# Patient Record
Sex: FEMALE | Race: WHITE | NOT HISPANIC OR LATINO | Employment: FULL TIME | URBAN - METROPOLITAN AREA
[De-identification: names, ages, dates, MRNs, and addresses within clinical notes are randomized per-mention and may not be internally consistent; named-entity substitution may affect disease eponyms.]

---

## 2020-01-25 ENCOUNTER — OFFICE VISIT (OUTPATIENT)
Dept: FAMILY MEDICINE | Facility: CLINIC | Age: 25
End: 2020-01-25
Payer: COMMERCIAL

## 2020-01-25 VITALS
SYSTOLIC BLOOD PRESSURE: 125 MMHG | HEART RATE: 61 BPM | BODY MASS INDEX: 24.44 KG/M2 | HEIGHT: 70 IN | TEMPERATURE: 98.5 F | WEIGHT: 170.75 LBS | RESPIRATION RATE: 16 BRPM | DIASTOLIC BLOOD PRESSURE: 69 MMHG | OXYGEN SATURATION: 100 %

## 2020-01-25 DIAGNOSIS — J01.00 ACUTE MAXILLARY SINUSITIS, RECURRENCE NOT SPECIFIED: Primary | ICD-10-CM

## 2020-01-25 RX ORDER — AMOXICILLIN 875 MG
875 TABLET ORAL 2 TIMES DAILY
Qty: 14 TABLET | Refills: 0 | Status: SHIPPED | OUTPATIENT
Start: 2020-01-25 | End: 2023-01-23

## 2020-01-25 ASSESSMENT — MIFFLIN-ST. JEOR: SCORE: 1604.77

## 2020-01-25 NOTE — PROGRESS NOTES
"SUBJECTIVE:   Yaa Lyon is a 24 year old female who presents to clinic today to discuss the following problem(s).    Patient is new to this clinic but prefers to address only acute issue noted below at this time.     Sinus pain and pressure  - been there for about a month  - was at its worst about 2.5 weeks ago  - but still feels lousy and now PND, headache  - on and off R ear pain  - worst in the morning, feels like she is hung over  - no fever, nausea  - further ROS as noted below    ROS:   CONSTITUTIONAL: NEGATIVE for chills, fatigue, fever, sweats and weight loss  EYES: NEGATIVE for diplopia, eye pain and photophobia  ENT/MOUTH: Intermittent ear pain. Persistent nasal congestion, postnasal drainage and rhinorrhea as noted above  RESP: NEGATIVE for cough, SOB/dyspnea and wheezing  CV: NEGATIVE for chest pain/chest pressure, dyspnea on exertion  GI: NEGATIVE for abdominal pain, diarrhea, dysphagia and nausea  NEURO: NEGATIVE for dizziness/lightheadedness   PSYCHIATRIC: NEGATIVE    Today's PHQ-2:  PHQ-2 ( 1999 Pfizer) 1/25/2020   Q1: Little interest or pleasure in doing things 0   Q2: Feeling down, depressed or hopeless 0   PHQ-2 Score 0       History reviewed. No pertinent past medical history.  History reviewed. No pertinent surgical history.  History reviewed. No pertinent family history.  Social History     Tobacco Use     Smoking status: Never Smoker     Smokeless tobacco: Never Used   Substance Use Topics     Alcohol use: None     Drug use: None     Social History     Social History Narrative     Not on file       No current outpatient medications on file.     No current facility-administered medications for this visit.      I have reviewed the patient's past medical, surgical, family, and social history.     OBJECTIVE:   /69 (BP Location: Left arm, Patient Position: Sitting, Cuff Size: Adult Regular)   Pulse 61   Temp 98.5  F (36.9  C)   Resp 16   Ht 1.778 m (5' 10\")   Wt 77.5 kg (170 lb " 12 oz)   LMP 12/30/2019 (Exact Date)   SpO2 100%   BMI 24.50 kg/m      Constitutional: well-appearing, appears stated age  Eyes: conjunctivae without erythema, sclera anicteric  ENT: audible congestion, tonsillar erythema with posterior drainage, TMs clear and non-bulging bilaterally    Cardiac: regular rate and rhythm, normal S1/S2, no murmur/rubs/gallops  Respiratory: lungs clear to auscultation bilaterally, normal work of breathing, no wheezes/crackles  Skin: no rashes, lesions, or wounds  Psych: affect is full and appropriate, speech is fluent and non-pressured    ASSESSMENT AND PLAN:     Yaa was seen today for sinus problem and headache.    Diagnoses and all orders for this visit:    Acute maxillary sinusitis, recurrence not specified  -     amoxicillin (AMOXIL) 875 MG tablet; Take 1 tablet (875 mg) by mouth 2 times daily    Given prolonged persistence of symptoms I have agreed to start patient on ABX as noted above at this time.     Discussed risks and benefits of medication. Discussed non-pharmacologic symptom management strategies and advised patient of concerns that would indicate a need for further medical advice as noted in patient instructions.       Bradford Cleveland MD  [facility]  01/25/2020, 9:43 AM

## 2020-01-25 NOTE — PATIENT INSTRUCTIONS
Patient Education     Acute Bacterial Rhinosinusitis (ABRS)    Acute bacterial rhinosinusitis (ABRS) is an infection of your nasal cavity and sinuses. It s caused by bacteria. Acute means that you ve had symptoms for less than 4 weeks, but possibly up to 12 weeks.  Understanding your sinuses  The nasal cavity is the large air-filled space behind your nose. The sinuses are a group of spaces formed by the bones of your face. They connect with your nasal cavity. ABRS causes the tissue lining these spaces to become inflamed. Mucus may not drain normally. This leads to facial pain and other symptoms.  What causes ABRS?  ABRS most often follows an upper respiratory infection caused by a virus. Bacteria then infect the lining of your nasal cavity and sinuses. But you can also get ABRS if you have:    Nasal allergies    Long-term nasal swelling and congestion not caused by allergies    Blockage in the nose  Symptoms of ABRS  The symptoms of ABRS may be different for each person and include:    Nasal congestion or blockage    Pain or pressure in the face    Thick, colored drainage from the nose  Other symptoms may include:    Runny nose    Fluid draining from the nose down the throat (postnasal drip)    Headache    Cough    Pain    Fever  Diagnosing ABRS  ABRS may be diagnosed if you ve had an upper respiratory infection like a cold and cough for 10 or more days without improvement or with worsening symptoms. Your healthcare provider will ask about your symptoms and your medical history. The provider will check your vital signs, including your temperature. You ll have a physical exam. The healthcare provider will check your ears, nose, and throat. You likely won t need any tests. If ABRS comes back, you may have a culture or other tests.  Treatment for ABRS  Treatment may include:    Antibiotic medicine. This is for symptoms that last for at least 10 to 14 days.    Nasal corticosteroid medicine. Drops or spray used in the  nose can lessen swelling and congestion.    Over-the-counter pain medicine. This is to lessen sinus pain and pressure.    Nasal decongestant medicine. Spray or drops may help to lessen congestion. Do not use them for more than a few days.    Salt wash (saline irrigation). This can help to loosen mucus.  Possible complications of ABRS  ABRS may come back or become long-term (chronic). In rare cases, ABRS may cause complications such as:     Inflamed tissue around the brain and spinal cord (meningitis)    Inflamed tissue around the eyes (orbital cellulitis)    Inflamed bones around the sinuses (osteitis)  These problems may need to be treated in a hospital with intravenous (IV) antibiotic medicine or surgery.  When to call the healthcare provider  Call your healthcare provider if you have any of the following:    Symptoms that don t get better, or get worse    Symptoms that don t get better after 3 to 5 days on antibiotics    Trouble seeing    Swelling around your eyes    Confusion or trouble staying awake   Date Last Reviewed: 5/1/2017 2000-2019 [employer]. [address] [place] [address]. All rights reserved. This information is not intended as a substitute for professional medical care. Always follow your healthcare professional's instructions.         Patient Education     Amoxicillin capsules or tablets  Brand Names: Amoxil, Moxilin, Sumox, Trimox  What is this medicine?  AMOXICILLIN (a mox i THUY in) is a penicillin antibiotic. It is used to treat certain kinds of bacterial infections. It will not work for colds, flu, or other viral infections.  How should I use this medicine?  Take this medicine by mouth with a glass of water. Follow the directions on your prescription label. You may take this medicine with food or on an empty stomach. Take your medicine at regular intervals. Do not take your medicine more often than directed. Take all of your medicine as directed even if you think  your are better. Do not skip doses or stop your medicine early.  Talk to your pediatrician regarding the use of this medicine in children. While this drug may be prescribed for selected conditions, precautions do apply.  What side effects may I notice from receiving this medicine?  Side effects that you should report to your doctor or health care professional as soon as possible:    allergic reactions like skin rash, itching or hives, swelling of the face, lips, or tongue    breathing problems    dark urine    redness, blistering, peeling or loosening of the skin, including inside the mouth    seizures    severe or watery diarrhea    trouble passing urine or change in the amount of urine    unusual bleeding or bruising    unusually weak or tired    yellowing of the eyes or skin  Side effects that usually do not require medical attention (report to your doctor or health care professional if they continue or are bothersome):    dizziness    headache    stomach upset    trouble sleeping  What may interact with this medicine?    amiloride    birth control pills    chloramphenicol    macrolides    probenecid    sulfonamides    tetracyclines    What if I miss a dose?  If you miss a dose, take it as soon as you can. If it is almost time for your next dose, take only that dose. Do not take double or extra doses.  Where should I keep my medicine?  Keep out of the reach of children.  Store between 68 and 77 degrees F (20 and 25 degrees C). Keep bottle closed tightly. Throw away any unused medicine after the expiration date.  What should I tell my health care provider before I take this medicine?  They need to know if you have any of these conditions:    asthma    kidney disease    an unusual or allergic reaction to amoxicillin, other penicillins, cephalosporin antibiotics, other medicines, foods, dyes, or preservatives    pregnant or trying to get pregnant    breast-feeding  What should I watch for while using this  medicine?  Tell your doctor or health care professional if your symptoms do not improve in 2 or 3 days. Take all of the doses of your medicine as directed. Do not skip doses or stop your medicine early.  If you are diabetic, you may get a false positive result for sugar in your urine with certain brands of urine tests. Check with your doctor.  Do not treat diarrhea with over-the-counter products. Contact your doctor if you have diarrhea that lasts more than 2 days or if the diarrhea is severe and watery.  NOTE:This sheet is a summary. It may not cover all possible information. If you have questions about this medicine, talk to your doctor, pharmacist, or health care provider. Copyright  2019 [employer]

## 2020-01-25 NOTE — NURSING NOTE
"24 year old  Chief Complaint   Patient presents with     Sinus Problem     pt reports pressure and pain with congestion for 1 month with post nasal drip starting for 2 weeks.      Headache     x2.5 weeks constant nagging headache       Blood pressure 125/69, pulse 61, temperature 98.5  F (36.9  C), resp. rate 16, height 1.778 m (5' 10\"), weight 77.5 kg (170 lb 12 oz), last menstrual period 12/30/2019, SpO2 100 %. Body mass index is 24.5 kg/m .  There is no problem list on file for this patient.      Wt Readings from Last 3 Encounters:   01/25/20 77.5 kg (170 lb 12 oz)     BP Readings from Last 6 Encounters:   01/25/20 125/69       No current outpatient medications on file.     No current facility-administered medications for this visit.        Social History     Tobacco Use     Smoking status: Never Smoker     Smokeless tobacco: Never Used   Substance Use Topics     Alcohol use: None     Drug use: None       Health Maintenance Due   Topic Date Due     PREVENTIVE CARE VISIT  1995     CHLAMYDIA SCREENING  1995     DTAP/TDAP/TD IMMUNIZATION (1 - Tdap) 05/04/2002     HPV IMMUNIZATION (1 - Female 2-dose series) 05/04/2006     HIV SCREENING  05/04/2010     PAP  05/04/2016     INFLUENZA VACCINE (1) 09/01/2019     PHQ-2  01/01/2020       No results found for: LINDA Manzano CMA, [facility]  January 25, 2020 9:40 AM    "

## 2022-08-23 ENCOUNTER — OFFICE VISIT (OUTPATIENT)
Dept: FAMILY MEDICINE | Facility: CLINIC | Age: 27
End: 2022-08-23
Payer: COMMERCIAL

## 2022-08-23 VITALS
OXYGEN SATURATION: 97 % | DIASTOLIC BLOOD PRESSURE: 78 MMHG | WEIGHT: 182.8 LBS | TEMPERATURE: 98 F | HEIGHT: 70 IN | RESPIRATION RATE: 15 BRPM | BODY MASS INDEX: 26.17 KG/M2 | SYSTOLIC BLOOD PRESSURE: 109 MMHG | HEART RATE: 65 BPM

## 2022-08-23 DIAGNOSIS — Z76.89 ENCOUNTER TO ESTABLISH CARE WITH NEW DOCTOR: ICD-10-CM

## 2022-08-23 DIAGNOSIS — Z12.4 CERVICAL CANCER SCREENING: ICD-10-CM

## 2022-08-23 DIAGNOSIS — Z11.4 SCREENING FOR HIV (HUMAN IMMUNODEFICIENCY VIRUS): ICD-10-CM

## 2022-08-23 DIAGNOSIS — Z00.00 ROUTINE GENERAL MEDICAL EXAMINATION AT A HEALTH CARE FACILITY: Primary | ICD-10-CM

## 2022-08-23 DIAGNOSIS — Z30.09 FAMILY PLANNING COUNSELING: ICD-10-CM

## 2022-08-23 DIAGNOSIS — Z12.83 ENCOUNTER FOR SCREENING FOR MALIGNANT NEOPLASM OF SKIN: ICD-10-CM

## 2022-08-23 DIAGNOSIS — Z11.59 NEED FOR HEPATITIS C SCREENING TEST: ICD-10-CM

## 2022-08-23 LAB
ALBUMIN SERPL-MCNC: 3.7 G/DL (ref 3.4–5)
ALP SERPL-CCNC: 69 U/L (ref 40–150)
ALT SERPL W P-5'-P-CCNC: 22 U/L (ref 0–50)
ANION GAP SERPL CALCULATED.3IONS-SCNC: 7 MMOL/L (ref 3–14)
AST SERPL W P-5'-P-CCNC: 19 U/L (ref 0–45)
BILIRUB SERPL-MCNC: 0.5 MG/DL (ref 0.2–1.3)
BUN SERPL-MCNC: 17 MG/DL (ref 7–30)
CALCIUM SERPL-MCNC: 9.7 MG/DL (ref 8.5–10.1)
CHLORIDE BLD-SCNC: 103 MMOL/L (ref 94–109)
CHOLEST SERPL-MCNC: 132 MG/DL
CO2 SERPL-SCNC: 25 MMOL/L (ref 20–32)
CREAT SERPL-MCNC: 0.75 MG/DL (ref 0.52–1.04)
ERYTHROCYTE [DISTWIDTH] IN BLOOD BY AUTOMATED COUNT: 11.9 % (ref 10–15)
FASTING STATUS PATIENT QL REPORTED: NO
FOLATE SERPL-MCNC: 11.2 NG/ML (ref 4.6–34.8)
GFR SERPL CREATININE-BSD FRML MDRD: >90 ML/MIN/1.73M2
GLUCOSE BLD-MCNC: 71 MG/DL (ref 70–99)
HCT VFR BLD AUTO: 43.4 % (ref 35–47)
HDLC SERPL-MCNC: 52 MG/DL
HGB BLD-MCNC: 14.6 G/DL (ref 11.7–15.7)
LDLC SERPL CALC-MCNC: 64 MG/DL
MCH RBC QN AUTO: 30.1 PG (ref 26.5–33)
MCHC RBC AUTO-ENTMCNC: 33.6 G/DL (ref 31.5–36.5)
MCV RBC AUTO: 90 FL (ref 78–100)
NONHDLC SERPL-MCNC: 80 MG/DL
PLATELET # BLD AUTO: 207 10E3/UL (ref 150–450)
POTASSIUM BLD-SCNC: 4.4 MMOL/L (ref 3.4–5.3)
PROT SERPL-MCNC: 6.8 G/DL (ref 6.8–8.8)
RBC # BLD AUTO: 4.85 10E6/UL (ref 3.8–5.2)
SODIUM SERPL-SCNC: 135 MMOL/L (ref 133–144)
TRIGL SERPL-MCNC: 80 MG/DL
TSH SERPL DL<=0.005 MIU/L-ACNC: 0.67 MU/L (ref 0.4–4)
VIT B12 SERPL-MCNC: 629 PG/ML (ref 232–1245)
WBC # BLD AUTO: 6.5 10E3/UL (ref 4–11)

## 2022-08-23 PROCEDURE — 99395 PREV VISIT EST AGE 18-39: CPT | Performed by: NURSE PRACTITIONER

## 2022-08-23 PROCEDURE — 82746 ASSAY OF FOLIC ACID SERUM: CPT | Performed by: NURSE PRACTITIONER

## 2022-08-23 PROCEDURE — 82607 VITAMIN B-12: CPT | Performed by: NURSE PRACTITIONER

## 2022-08-23 PROCEDURE — 84443 ASSAY THYROID STIM HORMONE: CPT | Performed by: NURSE PRACTITIONER

## 2022-08-23 PROCEDURE — G0145 SCR C/V CYTO,THINLAYER,RESCR: HCPCS | Performed by: NURSE PRACTITIONER

## 2022-08-23 PROCEDURE — 80061 LIPID PANEL: CPT | Performed by: NURSE PRACTITIONER

## 2022-08-23 PROCEDURE — 85027 COMPLETE CBC AUTOMATED: CPT | Performed by: NURSE PRACTITIONER

## 2022-08-23 PROCEDURE — 36415 COLL VENOUS BLD VENIPUNCTURE: CPT | Performed by: NURSE PRACTITIONER

## 2022-08-23 PROCEDURE — 80053 COMPREHEN METABOLIC PANEL: CPT | Performed by: NURSE PRACTITIONER

## 2022-08-23 PROCEDURE — 99213 OFFICE O/P EST LOW 20 MIN: CPT | Mod: 25 | Performed by: NURSE PRACTITIONER

## 2022-08-23 ASSESSMENT — ENCOUNTER SYMPTOMS
DIARRHEA: 0
JOINT SWELLING: 0
EYE PAIN: 0
WEAKNESS: 0
PALPITATIONS: 0
MYALGIAS: 0
HEADACHES: 0
HEARTBURN: 0
SHORTNESS OF BREATH: 0
DIZZINESS: 0
ABDOMINAL PAIN: 0
NERVOUS/ANXIOUS: 0
CHILLS: 0
CONSTIPATION: 0
HEMATOCHEZIA: 0
FREQUENCY: 0
COUGH: 0
DYSURIA: 0
PARESTHESIAS: 0
ARTHRALGIAS: 0
NAUSEA: 0
HEMATURIA: 0
FEVER: 0
SORE THROAT: 0

## 2022-08-23 NOTE — PROGRESS NOTES
SUBJECTIVE:   CC: Yaa Manzano is an 27 year old woman who presents for preventive health visit.     Patient has been advised of split billing requirements and indicates understanding: Yes     27 year old year old female  with PMH There is no problem list on file for this patient.   in clinic for preventive health care exam.       Healthy Habits:     Getting at least 3 servings of Calcium per day:  Yes    Bi-annual eye exam:  Yes    Dental care twice a year:  Yes    Sleep apnea or symptoms of sleep apnea:  None    Diet:  Regular (no restrictions)    Frequency of exercise:  2-3 days/week    Duration of exercise:  15-30 minutes    Taking medications regularly:  Not Applicable    Medication side effects:  Not applicable    PHQ-2 Total Score: 0    Additional concerns today:  Yes      Pap smear done on this date: 2019 (approximately), by this group: [facility], results were normal repeat in 3 years.       Today's PHQ-2 Score:   PHQ-2 ( 1999 Pfizer) 8/23/2022   Q1: Little interest or pleasure in doing things 0   Q2: Feeling down, depressed or hopeless 0   PHQ-2 Score 0   PHQ-2 Total Score (12-17 Years)- Positive if 3 or more points; Administer PHQ-A if positive -   Q1: Little interest or pleasure in doing things Not at all   Q2: Feeling down, depressed or hopeless Not at all   PHQ-2 Score 0       Abuse: Current or Past (Physical, Sexual or Emotional) - No  Do you feel safe in your environment? Yes    Have you ever done Advance Care Planning? (For example, a Health Directive, POLST, or a discussion with a medical provider or your loved ones about your wishes): No, advance care planning information given to patient to review.  Patient declined advance care planning discussion at this time.    Social History     Tobacco Use     Smoking status: Never Smoker     Smokeless tobacco: Never Used   Substance Use Topics     Alcohol use: Not on file     If you drink alcohol do you typically have >3 drinks per day or >7  drinks per week? No    Alcohol Use 8/23/2022   Prescreen: >3 drinks/day or >7 drinks/week? No   No flowsheet data found.    Reviewed orders with patient.  Reviewed health maintenance and updated orders accordingly - Yes  Lab work is in process  Labs reviewed in EPIC  BP Readings from Last 3 Encounters:   08/23/22 109/78   01/25/20 125/69    Wt Readings from Last 3 Encounters:   08/23/22 82.9 kg (182 lb 12.8 oz)   01/25/20 77.5 kg (170 lb 12 oz)               Breast Cancer Screening:    Breast CA Risk Assessment (FHS-7) 8/23/2022   Do you have a family history of breast, colon, or ovarian cancer? No / Unknown         Patient under 40 years of age: Routine Mammogram Screening not recommended.   Pertinent mammograms are reviewed under the imaging tab.    History of abnormal Pap smear: NO - age 21-29 PAP every 3 years recommended                  Pap Specimen Adequacy  Satisfactory for evaluation, endocervical/transformation zone component absent.    Pap Interpretation  Negative for intraepithelial lesion or malignancy (NILM).    Electronically signed by Teresa Johnson on 5/16/2019 at 11:40 AM   Gross Description     The specimen is received in SurePath fixative and properly labeled.  1 Pap-stained SurePath slide is prepared.   Pap Disclaimer     The Pap test is a screening test designed to aid in the detection of cervical cancer and its precursor lesions. It is not a diagnostic procedure and should not be used as the sole means of detecting cervical cancer. Both false-positive and false-negative reports may occur.           Reviewed and updated as needed this visit by clinical staff   Tobacco  Allergies  Meds  Problems  Med Hx  Surg Hx  Fam Hx  Soc   Hx          Reviewed and updated as needed this visit by Provider   Tobacco  Allergies  Meds  Problems  Med Hx  Surg Hx  Fam Hx               Review of Systems   Constitutional: Negative for chills and fever.   HENT: Negative for congestion, ear pain,  "hearing loss and sore throat.    Eyes: Negative for pain and visual disturbance.   Respiratory: Negative for cough and shortness of breath.    Cardiovascular: Negative for chest pain, palpitations and peripheral edema.   Gastrointestinal: Negative for abdominal pain, constipation, diarrhea, heartburn, hematochezia and nausea.   Breasts:  Negative for tenderness and discharge.   Genitourinary: Positive for pelvic pain. Negative for dysuria, frequency, genital sores, hematuria, urgency, vaginal bleeding and vaginal discharge.   Musculoskeletal: Negative for arthralgias, joint swelling and myalgias.   Skin: Negative for rash.   Neurological: Negative for dizziness, weakness, headaches and paresthesias.   Psychiatric/Behavioral: Negative for mood changes. The patient is not nervous/anxious.           OBJECTIVE:   /78 (BP Location: Left arm, Patient Position: Sitting, Cuff Size: Adult Large)   Pulse 65   Temp 98  F (36.7  C) (Temporal)   Resp 15   Ht 1.778 m (5' 10\")   Wt 82.9 kg (182 lb 12.8 oz)   LMP 08/17/2022 (Exact Date)   SpO2 97%   Breastfeeding No   BMI 26.23 kg/m    Physical Exam  Constitutional:       General: She is not in acute distress.     Appearance: She is well-developed.   HENT:      Right Ear: Tympanic membrane and external ear normal.      Left Ear: Tympanic membrane and external ear normal.      Nose: Nose normal.      Mouth/Throat:      Pharynx: No oropharyngeal exudate.   Eyes:      General:         Right eye: No discharge.         Left eye: No discharge.      Conjunctiva/sclera: Conjunctivae normal.      Pupils: Pupils are equal, round, and reactive to light.   Neck:      Thyroid: No thyromegaly.      Trachea: No tracheal deviation.   Cardiovascular:      Rate and Rhythm: Normal rate and regular rhythm.      Pulses: Normal pulses.      Heart sounds: Normal heart sounds, S1 normal and S2 normal. No murmur heard.    No friction rub. No S3 or S4 sounds.   Pulmonary:      Effort: " Pulmonary effort is normal. No respiratory distress.      Breath sounds: Normal breath sounds. No wheezing or rales.   Chest:   Breasts:      Right: No mass, nipple discharge or tenderness.      Left: No mass, nipple discharge or tenderness.       Abdominal:      General: Bowel sounds are normal.      Palpations: Abdomen is soft. There is no mass.      Tenderness: There is no abdominal tenderness.   Genitourinary:     Labia:         Left: Lesion present.       Cervix: No cervical motion tenderness or discharge.      Comments: Pelvic exam: bimanual exam showed that uterus and adnexa were normal in size without masses palpable.    Small nontender 2 mm cyst     Rectal exam:  Normal sphincter tone.  No masses palpable.    Musculoskeletal:         General: Normal range of motion.      Cervical back: Neck supple.   Lymphadenopathy:      Cervical: No cervical adenopathy.   Skin:     General: Skin is warm and dry.      Findings: No rash.   Neurological:      Mental Status: She is alert and oriented to person, place, and time.      Motor: No abnormal muscle tone.      Deep Tendon Reflexes: Reflexes are normal and symmetric.   Psychiatric:         Thought Content: Thought content normal.         Judgment: Judgment normal.           Diagnostic Test Results:  Labs reviewed in Epic    ASSESSMENT/PLAN:   Yaa was seen today for physical.    Diagnoses and all orders for this visit:    Routine general medical examination at a health care facility  Preventative exam w/no abnormalities and/or concerns listed in diagnoses; discussed health maintenance screenings including prostate, breast, cervical and colorectal ca screenings related to gender;  reviewed and reconciled medication, medical history and patient related health concerns  Plan: obtain metabolic labs  -     CBC with platelets; Future  -     Comprehensive metabolic panel; Future  -     Folate; Future  -     Vitamin B12; Future  -     TSH with free T4 reflex; Future  -     " Lipid Profile; Future  -     CBC with platelets  -     Comprehensive metabolic panel  -     Folate  -     Vitamin B12  -     TSH with free T4 reflex  -     Lipid Profile    Screening for HIV (human immunodeficiency virus)  Discussed; low risk; declined     Need for hepatitis C screening test  Discussed; low risk; declined     Cervical cancer screening  No abnormal findings; follow up per guidelines pending results  -     Pap Screen reflex to HPV if ASCUS - recommend age 25 - 29  -     HPV Hold (Lab Only)    Family planning counseling  The current method of family planning is None planning pregnancy  - will check folate, b12 and tsh    Encounter for screening for malignant neoplasm of skin  -     Adult Dermatology Referral; Future    Encounter to establish care with new doctor  Reviewed chronic health conditions; medications, labs and pertinent health concerns today    Other orders  -     REVIEW OF HEALTH MAINTENANCE PROTOCOL ORDERS  -     TDAP VACCINE (Adacel, Boostrix)      Patient has been advised of split billing requirements and indicates understanding: Yes    COUNSELING:  Reviewed preventive health counseling, as reflected in patient instructions       Regular exercise       Healthy diet/nutrition       Family planning       Consider Hep C screening for all patients one time for ages 18-79 years       HIV screeninx in teen years, 1x in adult years, and at intervals if high risk    Estimated body mass index is 26.23 kg/m  as calculated from the following:    Height as of this encounter: 1.778 m (5' 10\").    Weight as of this encounter: 82.9 kg (182 lb 12.8 oz).      She reports that she has never smoked. She has never used smokeless tobacco.      Counseling Resources:  ATP IV Guidelines  Pooled Cohorts Equation Calculator  Breast Cancer Risk Calculator  BRCA-Related Cancer Risk Assessment: FHS-7 Tool  FRAX Risk Assessment  ICSI Preventive Guidelines  Dietary Guidelines for Americans, 2010  USDA's " MyPlate  ASA Prophylaxis  Lung CA Screening    In addition to the preventive visit 20 minutes of the appointment were spent evaluating and developing a treatment plan for her additional concern(s).        SUSIE Wyatt [facility]

## 2022-08-25 LAB
BKR LAB AP GYN ADEQUACY: NORMAL
BKR LAB AP GYN INTERPRETATION: NORMAL
BKR LAB AP HPV REFLEX: NORMAL
BKR LAB AP LMP: NORMAL
BKR LAB AP PREVIOUS ABNORMAL: NORMAL
PATH REPORT.COMMENTS IMP SPEC: NORMAL
PATH REPORT.COMMENTS IMP SPEC: NORMAL
PATH REPORT.RELEVANT HX SPEC: NORMAL

## 2022-08-25 NOTE — RESULT ENCOUNTER NOTE
Sukhdev Mon,    Great news!  Your recent results are normal. Any results slightly above or below the normal range have been evaluated as clinically stable.     Let me know if you have any questions or concerns.    Sincerely,  SUSIE Wyatt CNP

## 2022-09-18 ENCOUNTER — HEALTH MAINTENANCE LETTER (OUTPATIENT)
Age: 27
End: 2022-09-18

## 2023-01-23 ENCOUNTER — OFFICE VISIT (OUTPATIENT)
Dept: DERMATOLOGY | Facility: CLINIC | Age: 28
End: 2023-01-23
Attending: NURSE PRACTITIONER
Payer: COMMERCIAL

## 2023-01-23 DIAGNOSIS — D22.9 MULTIPLE MELANOCYTIC NEVI: ICD-10-CM

## 2023-01-23 DIAGNOSIS — L81.2 EPHELIDES: ICD-10-CM

## 2023-01-23 DIAGNOSIS — L70.0 ACNE VULGARIS: Primary | ICD-10-CM

## 2023-01-23 DIAGNOSIS — D23.9 DERMATOFIBROMA: ICD-10-CM

## 2023-01-23 PROCEDURE — 99202 OFFICE O/P NEW SF 15 MIN: CPT | Performed by: DERMATOLOGY

## 2023-01-23 ASSESSMENT — PAIN SCALES - GENERAL: PAINLEVEL: NO PAIN (0)

## 2023-01-23 NOTE — PROGRESS NOTES
[facility] Dermatology Note    Encounter Date: Jan 23, 2023    Dermatology Problem List:  1. Dermatofibroma on the calf    ______________________________________    Impression/Plan:  1. Reassurance provided for benign lesions not treated today including ephelides, a dermatofibroma, and banal-appearing melanocytic nevi. Discussed sun protective behaviors including OTC spf 30+ sunscreen use and sun avoidance strategies.      Follow-up in 2-3 years.       Staff Involved:  Staff Only    Joseph Phillips MD   of Dermatology  Department of Dermatology  [facility]      CC:   Chief Complaint   Patient presents with     Derm Problem     Yaa is here today for a full body skin check. Multiple lesions of concern.        History of Present Illness:  Ms. Yaa Manzano is a 27 year old female who presents as a new patient.    Moles on abdomen and back - longstanding  - haven't changed in appearance, but having pinching feeling on/off for last 10 years    Spot on calf - new in past few years - bump - ?scar but no preceding trauma - no symptoms    Breakouts on neck intermittently    Labs:  N/A    Physical exam:  Vitals: There were no vitals taken for this visit.  GEN: This is a well developed, well-nourished female in no acute distress, in a pleasant mood.    SKIN: Young phototype II  - Waist-up skin, which includes the head/face, neck, both arms, upper chest, back, abdomen, digits and/or nails was examined. Feet were also examined  - There is a firm tan papule that dimples with lateral pressure on the calf.  - Multiple regular brown pigmented macules and papules are identified on the head/neck, trunk, extremities.   - Scattered brown macules on sun exposed areas.  - No other lesions of concern on areas examined.     Past Medical History:   History reviewed. No pertinent past medical history.  History reviewed. No pertinent surgical  history.    Social History:   reports that she has never smoked. She has never used smokeless tobacco.    Family History:  History reviewed. No pertinent family history.    Medications:  No current outpatient medications on file.     No Known Allergies

## 2023-01-23 NOTE — LETTER
1/23/2023       RE: Yaa Manzano  [address]  [place] [address]     Dear Colleague,    Thank you for referring your patient, Yaa Manzano, to the [facility] [place] at [facility]. Please see a copy of my visit note below.    [facility] Dermatology Note    Encounter Date: Jan 23, 2023    Dermatology Problem List:  1. Dermatofibroma on the calf    ______________________________________    Impression/Plan:  1. Reassurance provided for benign lesions not treated today including ephelides, a dermatofibroma, and banal-appearing melanocytic nevi. Discussed sun protective behaviors including OTC spf 30+ sunscreen use and sun avoidance strategies.      Follow-up in 2-3 years.       Staff Involved:  Staff Only    Joseph Phillips MD   of Dermatology  Department of Dermatology  [facility]      CC:   Chief Complaint   Patient presents with     Derm Problem     Yaa is here today for a full body skin check. Multiple lesions of concern.        History of Present Illness:  Ms. Yaa Manzano is a 27 year old female who presents as a new patient.    Moles on abdomen and back - longstanding  - haven't changed in appearance, but having pinching feeling on/off for last 10 years    Spot on calf - new in past few years - bump - ?scar but no preceding trauma - no symptoms    Breakouts on neck intermittently    Labs:  N/A    Physical exam:  Vitals: There were no vitals taken for this visit.  GEN: This is a well developed, well-nourished female in no acute distress, in a pleasant mood.    SKIN: Young phototype II  - Waist-up skin, which includes the head/face, neck, both arms, upper chest, back, abdomen, digits and/or nails was examined. Feet were also examined  - There is a firm tan papule that dimples with lateral pressure on the calf.  - Multiple  regular brown pigmented macules and papules are identified on the head/neck, trunk, extremities.   - Scattered brown macules on sun exposed areas.  - No other lesions of concern on areas examined.     Past Medical History:   History reviewed. No pertinent past medical history.  History reviewed. No pertinent surgical history.    Social History:   reports that she has never smoked. She has never used smokeless tobacco.    Family History:  History reviewed. No pertinent family history.    Medications:  No current outpatient medications on file.     No Known Allergies

## 2023-01-23 NOTE — NURSING NOTE
Dermatology Rooming Note    Yaa Maeve Jeronimosky's goals for this visit include:   Chief Complaint   Patient presents with     Derm Problem     Yaa is here today for a full body skin check. Multiple lesions of concern.      Alyssa Monsalve RN

## 2023-10-08 ENCOUNTER — HEALTH MAINTENANCE LETTER (OUTPATIENT)
Age: 28
End: 2023-10-08

## 2023-10-25 ENCOUNTER — OFFICE VISIT (OUTPATIENT)
Dept: DERMATOLOGY | Facility: CLINIC | Age: 28
End: 2023-10-25
Payer: COMMERCIAL

## 2023-10-25 DIAGNOSIS — I78.1 SPIDER ANGIOMA: Primary | ICD-10-CM

## 2023-10-25 DIAGNOSIS — L81.4 LENTIGINES: ICD-10-CM

## 2023-10-25 DIAGNOSIS — L71.9 ROSACEA: ICD-10-CM

## 2023-10-25 DIAGNOSIS — L57.8 ACTINIC SKIN DAMAGE: ICD-10-CM

## 2023-10-25 PROCEDURE — 99214 OFFICE O/P EST MOD 30 MIN: CPT | Performed by: STUDENT IN AN ORGANIZED HEALTH CARE EDUCATION/TRAINING PROGRAM

## 2023-10-25 NOTE — PROGRESS NOTES
[facility] Dermatology Note    Encounter Date: Oct 25, 2023    Dermatology Problem List:    ______________________________________    Impression/Plan:  Yaa was seen today for derm problem.    Diagnoses and all orders for this visit:    Spider angioma  -Nose  - Benign  - Discussed that it can be associate with states of high estrogen including pregnancy, she is currently around 6-tom months pregnant    Actinic skin damage  Lentigines  - Reviewed the compounding benefits of incremental changes to sun protective clothing behaviors including increased frequency of sunscreen and sun protective clothing like broad brimmed hats and longsleeved UPF containing clothing    Rosacea  - declines prescription topicals for now   - Reviewed the compounding benefits of incremental changes to sun protective clothing behaviors including increased frequency of sunscreen and sun protective clothing like broad brimmed hats and longsleeved UPF containing clothing      Follow-up PRn.       Staff Involved:  Staff Only    Nathan Lopez MD   of Dermatology  Department of Dermatology  [facility]      CC:   Chief Complaint   Patient presents with    Derm Problem     Lesion on the nose, few months, not going away per pt, skin check in jan        History of Present Illness:  Ms. Yaa Manzano is a 28 year old female who presents as a return patient.    Has a red spot on nose present for 2 months. Is ~6mo pregnant.     Labs:      Physical exam:  Vitals: There were no vitals taken for this visit.  GEN: well developed, well-nourished, in no acute distress, in a pleasant mood.     SKIN: Young phototype 1  - Focused examination of the face was performed.  -Central vascular papule with fanning radiating pattern of smaller telangiectasias on the nose  - erythema and telangiectasia involving forehead, cheeks, and chin   - Flat brown macules and patches in a sun  exposed areas on face and extremities  - No other lesions of concern on areas examined.     Past Medical History:   History reviewed. No pertinent past medical history.  History reviewed. No pertinent surgical history.    Social History:   reports that she has never smoked. She has never used smokeless tobacco.    Family History:  History reviewed. No pertinent family history.    Medications:  Current Outpatient Medications   Medication Sig Dispense Refill    benzoyl peroxide 5 % external liquid Apply topically daily (Patient not taking: Reported on 10/25/2023) 226 g 11     No Known Allergies

## 2023-10-25 NOTE — LETTER
10/25/2023         RE: Yaa Manzano  [address] [address]        Dear Colleague,    Thank you for referring your patient, Yaa Manzano, to the [facility]. Please see a copy of my visit note below.    [facility] Dermatology Note    Encounter Date: Oct 25, 2023    Dermatology Problem List:    ______________________________________    Impression/Plan:  Yaa was seen today for derm problem.    Diagnoses and all orders for this visit:    Spider angioma  -Nose  - Benign  - Discussed that it can be associate with states of high estrogen including pregnancy, she is currently around 6-tom months pregnant    Actinic skin damage  Lentigines  - Reviewed the compounding benefits of incremental changes to sun protective clothing behaviors including increased frequency of sunscreen and sun protective clothing like broad brimmed hats and longsleeved UPF containing clothing    Rosacea  - declines prescription topicals for now   - Reviewed the compounding benefits of incremental changes to sun protective clothing behaviors including increased frequency of sunscreen and sun protective clothing like broad brimmed hats and longsleeved UPF containing clothing      Follow-up PRn.       Staff Involved:  Staff Only    Nathan Lopez MD   of Dermatology  Department of Dermatology  [facility]      CC:   Chief Complaint   Patient presents with     Derm Problem     Lesion on the nose, few months, not going away per pt, skin check in [place]        History of Present Illness:  Ms. Yaa Manzano is a 28 year old female who presents as a return patient.    Has a red spot on nose present for 2 months. Is ~6mo pregnant.     Labs:      Physical exam:  Vitals: There were no vitals taken for this visit.  GEN: well developed, well-nourished, in no acute distress, in a pleasant mood.     SKIN: Young  phototype 1  - Focused examination of the face was performed.  -Central vascular papule with fanning radiating pattern of smaller telangiectasias on the nose  - erythema and telangiectasia involving forehead, cheeks, and chin   - Flat brown macules and patches in a sun exposed areas on face and extremities  - No other lesions of concern on areas examined.     Past Medical History:   History reviewed. No pertinent past medical history.  History reviewed. No pertinent surgical history.    Social History:   reports that she has never smoked. She has never used smokeless tobacco.    Family History:  History reviewed. No pertinent family history.    Medications:  Current Outpatient Medications   Medication Sig Dispense Refill     benzoyl peroxide 5 % external liquid Apply topically daily (Patient not taking: Reported on 10/25/2023) 226 g 11     No Known Allergies              Again, thank you for allowing me to participate in the care of your patient.        Sincerely,        Nathan Lopez MD

## 2024-12-01 ENCOUNTER — HEALTH MAINTENANCE LETTER (OUTPATIENT)
Age: 29
End: 2024-12-01